# Patient Record
Sex: MALE | Employment: FULL TIME | URBAN - METROPOLITAN AREA
[De-identification: names, ages, dates, MRNs, and addresses within clinical notes are randomized per-mention and may not be internally consistent; named-entity substitution may affect disease eponyms.]

---

## 2019-05-31 ENCOUNTER — OFFICE VISIT (OUTPATIENT)
Dept: URGENT CARE | Facility: CLINIC | Age: 69
End: 2019-05-31
Payer: MEDICARE

## 2019-05-31 VITALS
RESPIRATION RATE: 16 BRPM | WEIGHT: 174.38 LBS | BODY MASS INDEX: 27.37 KG/M2 | HEART RATE: 72 BPM | TEMPERATURE: 99 F | SYSTOLIC BLOOD PRESSURE: 160 MMHG | OXYGEN SATURATION: 95 % | DIASTOLIC BLOOD PRESSURE: 80 MMHG | HEIGHT: 67 IN

## 2019-05-31 DIAGNOSIS — R09.82 POST-NASAL DRIP: ICD-10-CM

## 2019-05-31 DIAGNOSIS — R05.9 COUGH: ICD-10-CM

## 2019-05-31 DIAGNOSIS — J06.9 UPPER RESPIRATORY TRACT INFECTION, UNSPECIFIED TYPE: Primary | ICD-10-CM

## 2019-05-31 PROCEDURE — 99204 OFFICE O/P NEW MOD 45 MIN: CPT | Mod: S$GLB,,, | Performed by: NURSE PRACTITIONER

## 2019-05-31 PROCEDURE — 99204 PR OFFICE/OUTPT VISIT, NEW, LEVL IV, 45-59 MIN: ICD-10-PCS | Mod: S$GLB,,, | Performed by: NURSE PRACTITIONER

## 2019-05-31 RX ORDER — CETIRIZINE HYDROCHLORIDE 10 MG/1
10 TABLET ORAL DAILY
Qty: 30 TABLET | Refills: 2 | Status: SHIPPED | OUTPATIENT
Start: 2019-05-31 | End: 2020-05-30

## 2019-05-31 RX ORDER — DEXAMETHASONE SODIUM PHOSPHATE 4 MG/ML
8 INJECTION, SOLUTION INTRA-ARTICULAR; INTRALESIONAL; INTRAMUSCULAR; INTRAVENOUS; SOFT TISSUE
Status: SHIPPED | OUTPATIENT
Start: 2019-05-31

## 2019-05-31 RX ORDER — FLUTICASONE PROPIONATE 50 MCG
2 SPRAY, SUSPENSION (ML) NASAL 2 TIMES DAILY
Qty: 1 BOTTLE | Refills: 0 | Status: SHIPPED | OUTPATIENT
Start: 2019-05-31

## 2019-05-31 RX ORDER — BENZONATATE 100 MG/1
100 CAPSULE ORAL 3 TIMES DAILY PRN
Qty: 30 CAPSULE | Refills: 1 | Status: SHIPPED | OUTPATIENT
Start: 2019-05-31 | End: 2020-05-30

## 2019-05-31 RX ORDER — LISINOPRIL 10 MG/1
10 TABLET ORAL DAILY
COMMUNITY

## 2019-05-31 RX ORDER — CODEINE PHOSPHATE AND GUAIFENESIN 10; 100 MG/5ML; MG/5ML
5 SOLUTION ORAL 3 TIMES DAILY PRN
Qty: 120 ML | Refills: 0 | Status: SHIPPED | OUTPATIENT
Start: 2019-05-31 | End: 2019-06-10

## 2019-05-31 NOTE — PATIENT INSTRUCTIONS
Enfermedad Respiratoria Viral [Uri, Viral Respiratory Illness, Adult, No Abx]  Usted tiene laura enfermedad respiratoria de las vías superiores provocada por un virus. Esta enfermedad es contagiosa violeta los primeros días. Se transmite por el aire, por la tos o el estornudo de la persona afectada, o por contacto directo con rolando persona (si robert toca a la persona enferma y después se toca los ojos, la nariz o la boca). La mayoría de las enfermedades virales mejoran en 7-10 días con reposo y simples xu caseros; aunque, en ocasiones, la enfermedad puede durar varias semanas. Los antibióticos son ineficaces contra los virus, por lo que generalmente no se recetan para esta enfermedad.    Cuidados En [place]:  1) Si los síntomas son severos, descanse en lopez casa violeta los primeros 2 ó 3 días. Cuando reanude marci actividades, evite cansarse demasiado.  2) Evite exponerse al humo de cigarrillo (suyo o de otras personas).  3) Puede usar Tylenol (acetaminofén) o ibuprofeno (Motrin o Advil) para controlar la fiebre o el dolor muscular y el dolor de bernardo. (La aspirina no debe usarse nunca en personas menores de 18 años enfermas con fiebre, ya que puede causar daños graves al hígado.)  4) Es posible que tenga poco apetito, por lo que laura dieta ligera es adecuada. Para evitar la deshidratación, alexia entre 6 y 8 vasos de líquido cada día (agua, refrescos, jugos de fruta, té, sopa etc.). La abundancia de líquido ayuda a desprender las secreciones de la nariz y los pulmones.  5) Los medicamentos sin receta para el resfriado no acortarán la duración de la enfermedad, suha pueden ser útiles para aliviar los siguientes síntomas: tos (Robitussin MD); dolor de garganta (Chloraseptic en comprimidos o spray); congestión nasal y de los senos paranasales (Actifed, Sudafed o Chlortrimeton).  Elliott laura VISITA DE CONTROL a lopez médico, o según le indiquen, si no se siente mejor violeta la semana próxima.  Busque Prontamente Atención  "Médica  si algo de lo siguiente ocurre:  -- Tos con esputo de color (mucosidad) o con ramon.  -- Dolor en el pecho, falta de aire, silbidos o dificultad para respirar.  -- Dolor de bernardo candace, dolor en la alba, el erika o los oídos.  -- Fiebre superior a los 100.4º F (38.0º C) violeta más de ben días.  -- Incapacidad de tragar a causa del dolor de garganta.  Date Last Reviewed: 9/13/2015  © [id number] [employer]. [address] [place] [id number]. Todos los derechos reservados. Esta información no pretende sustituir la atención médica profesional. Sólo lopez médico puede diagnosticar y tratar un problema de yevgeniy.        Rinitis alérgica  La rinitis alérgica es laura reacción alérgica que afecta la nariz y, con frecuencia, los ojos. Se la suele conocer bouchra alergias nasales. Es frecuente que las alergias nasales se deban a elementos que están presentes en el medioambiente y se respiran. Según a qué sea sensible, las alergias nasales pueden presentarse únicamente violeta ciertas estaciones. O también pueden ocurrir violeta todo el año. Los alérgenos comunes de interior incluyen los ácaros del polvo, el moho, las cucarachas y la caspa de las mascotas. Los alérgenos de exterior incluyen el polen de los árboles, los pastos y las hierbas.  Los síntomas son, por ejemplo, goteo nasal, congestión y comezón en la nariz. También incluyen estornudos, ojos rojos y con comezón, y areolas oscuras ("ojeras alérgicas") debajo de los ojos. Además, usted puede estar irritable y cansado. Las alergias olivia también pueden afectar la respiración y desencadenar laura afección llamada asma.  Se pueden realizar pruebas para detectar a qué alérgenos reacciona usted. Es posible que le remitan a un especialista en alergias para que le evalúe y le jose pruebas.  Cuidados en lopez casa  Probablemente el proveedor de atención médica le recete medicamentos para ayudar a aliviar los síntomas de alergia. Siga las instrucciones " "para syl estos medicamentos.  Pida consejo al proveedor sobre cómo evitar las sustancias a las que es alérgico. Los siguientes son algunos consejos para cada tipo de alérgeno.  Caspa de mascota:  · No tenga mascotas con pelaje o plumas.  · Si no puede evitar tenerlas, manténgalas fuera del dormitorio y de los muebles tapizados.  Polen:  · Cuando el nivel de polen en el ambiente está alto, mantenga cerradas las ventanas de lopez casa y lopez automóvil. De ser posible, use en cambio el aire acondicionado.  · Use laura mascarilla con filtro cuando shai el césped o trabaje en el jardín.  Ácaros del polvo en la casa:  · Lave la ropa de cama todas las semanas con [place] y detergente o séquela en un ambiente caliente.  · Cubra el colchón, el somier y las almohadas con fundas antialérgicas.  · Si es posible, duerma en un cuarto que no tenga tapete, manish ni muebles tapizados.  Cucarachas:  · Guarde la comida en recipientes cerrados herméticamente.  · Saque la basura de lopez casa rápidamente.  · Arregle las filtraciones de agua.  Moho:  · Mantenga bajo el nivel de humedad usando un deshumidificador o el aparato de aire acondicionado. Mantenga el deshumidificador y el aire acondicionado limpios y sin moho.  · Limpie áreas que tengan moho con agua y blanqueador.  En general:  · Pase la aspiradora laura o dos veces por semana. Si es posible, use laura aspiradora que tenga un filtro de aire de mary eficiencia para partículas ("HEPA", por marci siglas en inglés).  · No fume. Evite el humo de cigarrillo. Sarika humo es irritante y puede empeorar los síntomas.  Visitas de control  Programe laura visita de control según le indique el proveedor de atención médica o nuestro personal. Si le remitieron a un especialista en alergias, coordine rolando lucille sin demoras.  Cuándo debe buscar atención médica  Llame enseguida a lopez proveedor de atención médica si se presenta cualquiera de las siguientes situaciones:  · Tos o silbidos al respirar  · Fiebre " "superior a 100.4° F (38° C)  · Continuidad de los síntomas, síntomas nuevos o empeoramiento de los síntomas  Llame al 911 de inmediato si presenta los siguientes síntomas:  · Dificultad para respirar  · Urticaria (bultos rojizos)  · Hinchazón grave de la alba o comezón intensa de los ojos o la boca   Date Last Reviewed: 4/26/2015  © [id number] [employer]. [address] [place] [id number]. Todos los derechos reservados. Esta información no pretende sustituir la atención médica profesional. Sólo lopez médico puede diagnosticar y tratar un problema de yevgeniy.        Alergia estacional  La alergia estacional también se conoce bouchra fiebre del heno. Puede presentarse cuando laura persona se expone al polen que proviene del césped, la hierba, los árboles y los arbustos. Kylah tipo de alergia sucede violeta la primavera y el verano cuando el polen entra en contacto con la membrana que recubre la nariz, con los ojos, los párpados, los senos paranasales y la garganta. Hace que los tejidos liberen "histamina". Esta sustancia provoca comezón e hinchazón. Y eso puede provocar laura secreción acuosa de los ojos o la nariz. También puede ocasionar estornudos violentos, congestión nasal, goteo de la nariz, picazón de los ojos, la nariz, la garganta y la boca, sensación de que la garganta está áspera y tos seca.     Cuidados en la casa  No se puede curar la alergia estacional, suha sí se pueden aliviar los síntomas tomando estas medidas.  · Evite o reduzca el contacto con los alérgenos siempre que sea posible.  ¨ Permanezca adentro en los días calurosos y ventosos de la época del polen.  ¨ Mantenga las ventanas y las flower cerradas. Para airear, use el acondicionador de aire en lopez casa y automóvil. Kylah aparato filtra el aire.  ¨ Cambie los filtros del aire acondicionado con frecuencia.  · Los descongestionantes en pastillas y en espray reducen la hinchazón de los tejidos y la secreción acuosa. Si usa un " medicamento en espray con demasiada frecuencia, los síntomas pueden empeorar. No lo utilice con mayor frecuencia que la recomendada. A veces usted puede experimentar un efecto de rebote (los síntomas empeoran) al dejar de usarlos. Hable con lopez proveedor de atención médica o farmacéutico sobre estos medicamentos antes de tomarlos, especialmente si usted tiene hipertensión o problemas del corazón.  · Los antihistamínicos bloquean la liberación de histamina phu la reacción alérgica. Son más eficaces si se chanell ANTES de que aparezcan los síntomas. A menos que le hayan indicado un antihistamínico de venta con receta, puede syl antihistamínicos de venta sin receta que no causen somnolencia. Pídale a lopez farmacéutico que le dé sugerencias.  · También es posible que le receten algún spray nasal con esteroides o esteroides orales para los síntomas más graves. Ayudan a reducir la inflamación local que puede empeorar la reacción alérgica.  · Si tiene asma, la época del polen puede hacer que marci síntomas del asma empeoren. Phu kitty período, es importante que tome marci medicamentos para el asma según le hayan indicado para evitar o tratar un ataque de asma. Algunas personas con ASMA presentan un agravamiento de los síntomas al syl antihistamínicos. Se debe al efecto secante que estos medicamentos producen en los pulmones. Si observa eso, deje de syl antihistamínicos, alexia más líquidos que lo habitual e informe de esto a lopez médico.  · Si tiene secreción o congestión de los senos paranasales, un enjuague nasal salino puede aliviar los síntomas. El enjuague reduce la hinchazón y elimina el exceso de mucosidad. Permite que los senos paranasales drenen. Encontrará equipos ya listos para hacerse los enjuagues en la mayoría de las farmacias. Traen paquetes de sal premezclada y un dispositivo de irrigación.  Visitas de control  Asista a las visitas de seguimiento con lopez proveedor de atención médica o según le hayan  indicado. Si le remitieron a un especialista, jose la lucille sin demora.  ¿Cuándo debe buscar atención médica?  Llame a lopez proveedor de atención médica si se presenta cualquiera de los siguientes síntomas:  · Dolor en la alba, los oídos o los senos paranasales, o supuración de fluido de color de la nariz.  · Dolor de bernardo candace.  · Evelio vez usted tenga asma y marci síntomas de asma no estén respondiendo a las dosis habituales de lopez medicamento.  · Tos con expulsión de mucho esputo (mucosidad) de color.  · Fiebre de 100.4° F (38° C) o más mary, o bouchra le haya indicado lopez proveedor de atención médica.  Llame al 911  Comuníquese con el servicio de emergencias si se presenta alguno de estos síntomas:  · Problemas para respirar o tragar, silbidos  · Voz ronca o problemas para hablar  · Confusión  · Somnolencia extrema o problemas para despertarse  · Desmayo o pérdida del conocimiento  · Ritmo cardíaco acelerado  · Presión arterial baja  · Sensación de morirse  · Náuseas, vómitos, dolor abdominal, diarrea  · Vómito de ramon o gran cantidad de ramon en las heces  · Convulsión  Date Last Reviewed: 7/30/2015  © 4741-6734 [employer]. [address] [place] [address], [place] [address]. Todos los derechos reservados. Esta información no pretende sustituir la atención médica profesional. Sólo lopez médico puede diagnosticar y tratar un problema de yevgeniy.

## 2019-05-31 NOTE — PROGRESS NOTES
"Subjective:       Patient ID: Shiva Diallo is a 69 y.o. male.    Vitals:  height is 5' 7" (1.702 m) and weight is 79.1 kg (174 lb 6.4 oz). His temperature is 98.7 °F (37.1 °C). His blood pressure is 160/80 (abnormal) and his pulse is 72. His respiration is 16 and oxygen saturation is 95%.     Chief Complaint: Cough    Cough   This is a new problem. The current episode started more than 1 month ago. The cough is non-productive. Associated symptoms include postnasal drip and a sore throat. Pertinent negatives include no chest pain, chills, fever, headaches, myalgias, rash or shortness of breath.       Constitution: Negative for chills, fatigue and fever.   HENT: Positive for congestion, postnasal drip, sinus pain, sinus pressure and sore throat.    Neck: Negative for painful lymph nodes.   Cardiovascular: Negative for chest pain and leg swelling.   Eyes: Negative for double vision and blurred vision.   Respiratory: Positive for cough. Negative for shortness of breath.    Gastrointestinal: Negative for nausea, vomiting and diarrhea.   Genitourinary: Negative for dysuria, frequency and urgency.   Musculoskeletal: Negative for joint pain, joint swelling, muscle cramps and muscle ache.   Skin: Negative for color change, pale and rash.   Allergic/Immunologic: Negative for seasonal allergies.   Neurological: Negative for dizziness, history of vertigo, light-headedness, passing out and headaches.   Hematologic/Lymphatic: Negative for swollen lymph nodes, easy bruising/bleeding and history of blood clots. Does not bruise/bleed easily.   Psychiatric/Behavioral: Negative for nervous/anxious, sleep disturbance and depression. The patient is not nervous/anxious.        Objective:      Physical Exam   Constitutional: He is oriented to person, place, and time. He appears well-developed and well-nourished. He is active and cooperative.   HENT:   Head: Normocephalic and atraumatic.   Right Ear: Hearing, external ear and ear canal " normal. A middle ear effusion is present.   Left Ear: Hearing, external ear and ear canal normal. A middle ear effusion is present.   Nose: Mucosal edema and rhinorrhea present.   Mouth/Throat: Uvula is midline and mucous membranes are normal. Posterior oropharyngeal erythema present.   Eyes: Pupils are equal, round, and reactive to light. Conjunctivae, EOM and lids are normal.   Neck: Trachea normal, normal range of motion and phonation normal. Neck supple.   Cardiovascular: Normal rate, regular rhythm, normal heart sounds, intact distal pulses and normal pulses.   Pulmonary/Chest: Effort normal and breath sounds normal.   Abdominal: Soft. Bowel sounds are normal.   Musculoskeletal: Normal range of motion.   Neurological: He is alert and oriented to person, place, and time. He has normal strength. GCS eye subscore is 4. GCS verbal subscore is 5. GCS motor subscore is 6.   Skin: Skin is warm, dry and intact.   Psychiatric: He has a normal mood and affect. His behavior is normal. Judgment and thought content normal.   Nursing note and vitals reviewed.      Assessment:       1. Upper respiratory tract infection, unspecified type    2. Cough    3. Post-nasal drip        Plan:         Upper respiratory tract infection, unspecified type    Cough    Post-nasal drip    Other orders  -     dexamethasone injection 8 mg  -     cetirizine (ZYRTEC) 10 MG tablet; Take 1 tablet (10 mg total) by mouth once daily.  Dispense: 30 tablet; Refill: 2  -     fluticasone propionate (FLONASE) 50 mcg/actuation nasal spray; 2 sprays (100 mcg total) by Each Nare route 2 (two) times daily.  Dispense: 1 Bottle; Refill: 0  -     guaifenesin-codeine 100-10 mg/5 ml (CHERATUSSIN AC)  mg/5 mL syrup; Take 5 mLs by mouth 3 (three) times daily as needed for Cough.  Dispense: 120 mL; Refill: 0  -     benzonatate (TESSALON PERLES) 100 MG capsule; Take 1 capsule (100 mg total) by mouth 3 (three) times daily as needed for Cough.  Dispense: 30  capsule; Refill: 1

## 2020-06-03 ENCOUNTER — LAB VISIT (OUTPATIENT)
Dept: LAB | Facility: HOSPITAL | Age: 70
End: 2020-06-03
Attending: NURSE PRACTITIONER
Payer: MEDICARE

## 2020-06-03 DIAGNOSIS — E11.9 DIABETES MELLITUS WITHOUT COMPLICATION: Primary | ICD-10-CM

## 2020-06-03 LAB
ALBUMIN SERPL BCP-MCNC: 4.3 G/DL (ref 3.5–5.2)
ALP SERPL-CCNC: 57 U/L (ref 55–135)
ALT SERPL W/O P-5'-P-CCNC: 15 U/L (ref 10–44)
ANION GAP SERPL CALC-SCNC: 9 MMOL/L (ref 8–16)
AST SERPL-CCNC: 20 U/L (ref 10–40)
BILIRUB SERPL-MCNC: 1.1 MG/DL (ref 0.1–1)
BUN SERPL-MCNC: 20 MG/DL (ref 8–23)
CALCIUM SERPL-MCNC: 9.4 MG/DL (ref 8.7–10.5)
CHLORIDE SERPL-SCNC: 105 MMOL/L (ref 95–110)
CO2 SERPL-SCNC: 27 MMOL/L (ref 23–29)
CREAT SERPL-MCNC: 1.1 MG/DL (ref 0.5–1.4)
EST. GFR  (AFRICAN AMERICAN): >60 ML/MIN/1.73 M^2
EST. GFR  (NON AFRICAN AMERICAN): >60 ML/MIN/1.73 M^2
ESTIMATED AVG GLUCOSE: 146 MG/DL (ref 68–131)
GLUCOSE SERPL-MCNC: 93 MG/DL (ref 70–110)
HBA1C MFR BLD HPLC: 6.7 % (ref 4.5–6.2)
POTASSIUM SERPL-SCNC: 4.2 MMOL/L (ref 3.5–5.1)
PROT SERPL-MCNC: 7.3 G/DL (ref 6–8.4)
SODIUM SERPL-SCNC: 141 MMOL/L (ref 136–145)

## 2020-06-03 PROCEDURE — 83036 HEMOGLOBIN GLYCOSYLATED A1C: CPT

## 2020-06-03 PROCEDURE — 80053 COMPREHEN METABOLIC PANEL: CPT

## 2020-06-03 PROCEDURE — 36415 COLL VENOUS BLD VENIPUNCTURE: CPT

## 2022-01-11 ENCOUNTER — LAB VISIT (OUTPATIENT)
Dept: PRIMARY CARE CLINIC | Facility: OTHER | Age: 72
End: 2022-01-11
Attending: INTERNAL MEDICINE
Payer: MEDICARE

## 2022-01-11 DIAGNOSIS — Z20.822 ENCOUNTER FOR LABORATORY TESTING FOR COVID-19 VIRUS: ICD-10-CM

## 2022-01-11 PROCEDURE — U0003 INFECTIOUS AGENT DETECTION BY NUCLEIC ACID (DNA OR RNA); SEVERE ACUTE RESPIRATORY SYNDROME CORONAVIRUS 2 (SARS-COV-2) (CORONAVIRUS DISEASE [COVID-19]), AMPLIFIED PROBE TECHNIQUE, MAKING USE OF HIGH THROUGHPUT TECHNOLOGIES AS DESCRIBED BY CMS-2020-01-R: HCPCS | Performed by: INTERNAL MEDICINE

## 2022-01-12 LAB — SARS-COV-2- CYCLE NUMBER: 36

## 2022-01-13 DIAGNOSIS — U07.1 COVID-19 VIRUS DETECTED: ICD-10-CM

## 2022-01-13 LAB — SARS-COV-2 RNA RESP QL NAA+PROBE: DETECTED
